# Patient Record
Sex: FEMALE | Race: WHITE | Employment: UNEMPLOYED | URBAN - METROPOLITAN AREA
[De-identification: names, ages, dates, MRNs, and addresses within clinical notes are randomized per-mention and may not be internally consistent; named-entity substitution may affect disease eponyms.]

---

## 2020-09-16 ENCOUNTER — HOSPITAL ENCOUNTER (EMERGENCY)
Age: 60
Discharge: HOME | End: 2020-09-16
Attending: EMERGENCY MEDICINE

## 2020-09-16 VITALS
WEIGHT: 160 LBS | SYSTOLIC BLOOD PRESSURE: 160 MMHG | DIASTOLIC BLOOD PRESSURE: 69 MMHG | BODY MASS INDEX: 27.31 KG/M2 | HEIGHT: 64 IN | RESPIRATION RATE: 16 BRPM | HEART RATE: 81 BPM | OXYGEN SATURATION: 100 % | TEMPERATURE: 97.3 F

## 2020-09-16 DIAGNOSIS — S05.01XA ABRASION OF RIGHT CORNEA, INITIAL ENCOUNTER: Primary | ICD-10-CM

## 2020-09-16 PROCEDURE — 99283 EMERGENCY DEPT VISIT LOW MDM: CPT

## 2020-09-16 PROCEDURE — 74011000250 HC RX REV CODE- 250: Performed by: EMERGENCY MEDICINE

## 2020-09-16 RX ORDER — OFLOXACIN 3 MG/ML
SOLUTION/ DROPS OPHTHALMIC
Qty: 5 ML | Refills: 0 | Status: SHIPPED | OUTPATIENT
Start: 2020-09-16

## 2020-09-16 RX ORDER — PROPARACAINE HYDROCHLORIDE 5 MG/ML
1 SOLUTION/ DROPS OPHTHALMIC
Status: COMPLETED | OUTPATIENT
Start: 2020-09-16 | End: 2020-09-16

## 2020-09-16 RX ADMIN — PROPARACAINE HYDROCHLORIDE 1 DROP: 5 SOLUTION/ DROPS OPHTHALMIC at 01:50

## 2020-09-16 RX ADMIN — FLUORESCEIN SODIUM 1 STRIP: 0.6 STRIP OPHTHALMIC at 01:50

## 2020-09-16 NOTE — DISCHARGE INSTRUCTIONS
You were seen and evaluated in the Emergency Department. Please understand that your work up is not all encompassing and you should follow up with your primary care physician for further management and continuity of care. Please return to Emergency Department or seek medical attention immediately if you have acute worsening in your symptoms or develop chest pain, shortness of breath, repeated vomiting, fever, altered level of consciousness, coughing up blood, or start sweating and feel clammy. If you were prescribed any medicine for home, please take as prescribed by your health-care provider. If you were given any follow-up appointments or numbers to call, please do so as instructed. Avoid any tobacco products or excessive alcohol. Patient Education        Corneal Scratches: Care Instructions  Your Care Instructions     The cornea is the clear surface that covers the front of the eye. When a speck of dirt, a wood chip, an insect, or another object flies into your eye, it can cause a painful scratch on the cornea. Wearing contact lenses too long or rubbing your eyes can also scratch the cornea. Small scratches usually heal in a day or two. Deeper scratches may take longer. If you have had a foreign object removed from your eye or you have a corneal scratch, you will need to watch for infection and vision problems while your eye heals. Follow-up care is a key part of your treatment and safety. Be sure to make and go to all appointments, and call your doctor if you are having problems. It's also a good idea to know your test results and keep a list of the medicines you take. How can you care for yourself at home? · The doctor probably used a medicine during your exam to numb your eye. When it wears off in 30 to 60 minutes, your eye pain may come back. Take pain medicines exactly as directed. ? If the doctor gave you a prescription medicine for pain, take it as prescribed.   ? If you are not taking a prescription pain medicine, ask your doctor if you can take an over-the-counter medicine. ? Do not take two or more pain medicines at the same time unless the doctor told you to. Many pain medicines have acetaminophen, which is Tylenol. Too much acetaminophen (Tylenol) can be harmful. · Do not rub your injured eye. Rubbing can make it worse. · Use the prescribed eyedrops or ointment as directed. Be sure the dropper or bottle tip is clean. To put in eyedrops or ointment:  ? Tilt your head back, and pull your lower eyelid down with one finger. ? Drop or squirt the medicine inside the lower lid. ? Close your eye for 30 to 60 seconds to let the drops or ointment move around. ? Do not touch the ointment or dropper tip to your eyelashes or any other surface. · Do not use your contact lens in your hurt eye until your doctor says you can. Also, do not wear eye makeup until your eye has healed. · Do not drive if you have blurred vision. · Bright light may hurt. Sunglasses can help. · To prevent eye injuries in the future, wear safety glasses or goggles when you work with machines or tools, mow the lawn, or ride a bike or motorcycle. When should you call for help? Call your doctor now or seek immediate medical care if:    · You have signs of an eye infection, such as:  ? Pus or thick discharge coming from the eye.  ? Redness or swelling around the eye.  ? A fever.     · You have new or worse eye pain.     · You have vision changes.     · It feels like there is something in your eye.     · Light hurts your eye. Watch closely for changes in your health, and be sure to contact your doctor if:    · You do not get better as expected. Where can you learn more? Go to http://guerita-kylee.info/  Enter G403 in the search box to learn more about \"Corneal Scratches: Care Instructions. \"  Current as of: December 18, 2019               Content Version: 12.6  © 1985-0456 [employer].    Care instructions adapted under license by [employer] (which disclaims liability or warranty for this information). If you have questions about a medical condition or this instruction, always ask your healthcare professional. [address] any warranty or liability for your use of this information.

## 2020-09-16 NOTE — ED TRIAGE NOTES
Pt arrives c/o right eye pain. Pt states that she was seen previously and prescribed a medication that she was unable to . Pt states that the pain \"hurts and feels like there is a critter running around in there. \" Pt states she was told she had a cyst on her eyelid.

## 2020-09-16 NOTE — ED PROVIDER NOTES
EMERGENCY DEPARTMENT HISTORY AND PHYSICAL EXAM    Date: 9/16/2020  Patient Name: Gaylan Alpers    History of Presenting Illness     Chief Complaint   Patient presents with    Eye Pain         History Provided By: Patient    Additional History (Context):   Gaylan Alpers is a 60 y.o. female with PMHX nicotine dependence presents to the emergency department C/O right eye pain x2 days. Was seen by her ophthalmologist twice and was told that she had a corneal abrasion. Was prescribed antibiotic ophthalmic drops however did not  the prescription. Reports photophobia. Pt denies fever, visual changes, eye discharge, and any other sxs or complaints. PCP: No primary care provider on file. Past History     Past Medical History:  Past Medical History:   Diagnosis Date    Heart attack [facility])        Past Surgical History:  Past Surgical History:   Procedure Laterality Date    HX CATARACT REMOVAL         Family History:  History reviewed. No pertinent family history. Social History:  Social History     Tobacco Use    Smoking status: Light Tobacco Smoker   Substance Use Topics    Alcohol use: Yes     Comment: socially    Drug use: Never       Allergies: Allergies   Allergen Reactions    Codeine Other (comments)     hallucinations         Review of Systems   Review of Systems   Constitutional: Negative for chills and fever. HENT: Negative for congestion, ear pain, sinus pain and sore throat. Eyes: Positive for photophobia, pain and redness. Negative for visual disturbance. Respiratory: Negative for cough and shortness of breath. Cardiovascular: Negative for chest pain and leg swelling. Gastrointestinal: Negative for abdominal pain, constipation, diarrhea, nausea and vomiting. Genitourinary: Negative for dysuria, hematuria, vaginal bleeding and vaginal discharge. Musculoskeletal: Negative for back pain and neck pain. Skin: Negative for rash and wound.    Neurological: Negative for dizziness, tremors, weakness, light-headedness and numbness. All other systems reviewed and are negative. Physical Exam     Vitals:    09/16/20 0142   BP: (!) 160/69   Pulse: 81   Resp: 16   Temp: 97.3 °F (36.3 °C)   SpO2: 100%   Weight: 72.6 kg (160 lb)   Height: 5' 4\" (1.626 m)     Physical Exam    Nursing note and vitals reviewed    Constitutional: Elderly  female, no acute distress  Head: Normocephalic, Atraumatic  Eyes: Pupils are equal, round, and reactive to light, EOMI. mild erythematous conjunctiva on the right. Neck: Supple, non-tender  Chest: Normal work of breathing and chest excursion bilaterally  Back: No evidence of trauma or deformity  Extremities: No evidence of trauma or deformity, no LE edema. No streaking erythema, vesicular lesions, ulcerations or bulla  Skin: Warm and dry, normal cap refill  Neuro: Alert and appropriate, CN intact, normal speech, moving all 4 extremities freely and symmetrically  Psychiatric: Normal mood and affect       Diagnostic Study Results     Labs -   No results found for this or any previous visit (from the past 12 hour(s)). Radiologic Studies -   No orders to display     CT Results  (Last 48 hours)    None        CXR Results  (Last 48 hours)    None            Medical Decision Making   I am the first provider for this patient. I reviewed the vital signs, available nursing notes, past medical history, past surgical history, family history and social history. Vital Signs-Reviewed the patient's vital signs. Records Reviewed: Nursing Notes and Old Medical Records    Provider Notes:   60 y.o. female presenting with right eye pain x2 days. On exam patient is sitting in a darkened room. She has mild erythematous conjunctiva on the right. However pupils are equal and reactive to light. Eye was anesthetized using topical proparacaine. Fluorescein strip stain was applied during a corneal abrasion at the 1 o'clock position.   Will discharge with moxifloxacin ophthalmic drops. Procedures:  Eye Stain      Date/Time: 9/16/2020 2:04 AM    Performed by: attending        Corneal abrasion was present on eyelid eversion. Right eye location: 1 o'clock  Cornea is clear. Anterior chamber is clear. My total time at bedside, performing this procedure was 1-15 minutes. ED Course:   1:48 AM   Initial assessment performed. The patients presenting problems have been discussed, and they are in agreement with the care plan formulated and outlined with them. I have encouraged them to ask questions as they arise throughout their visit. SMOKING CESSATION:  The patient was counseled on the dangers of tobacco use, and was advised to quit. Reviewed strategies to maximize success, including removing cigarettes and smoking materials from environment. Discussion took 3-5 minutes, and pt expressed understanding. Diagnosis and Disposition       DISCHARGE NOTE:  2:02 AM    Beth Payne's  results have been reviewed with her. She has been counseled regarding her diagnosis, treatment, and plan. She verbally conveys understanding and agreement of the signs, symptoms, diagnosis, treatment and prognosis and additionally agrees to follow up as discussed. She also agrees with the care-plan and conveys that all of her questions have been answered. I have also provided discharge instructions for her that include: educational information regarding their diagnosis and treatment, and list of reasons why they would want to return to the ED prior to their follow-up appointment, should her condition change. She has been provided with education for proper emergency department utilization. CLINICAL IMPRESSION:    1. Abrasion of right cornea, initial encounter        PLAN:  1. D/C Home  2.    Current Discharge Medication List      START taking these medications    Details   ofloxacin (FLOXIN) 0.3 % ophthalmic solution Apply to R eye BID x 7 days  Qty: 5 mL, Refills: 0           3.   Follow-up Information     Follow up With Specialties Details Why Contact Info    [address]  Schedule an appointment as soon as possible for a visit  [facility]    THE [facility] EMERGENCY DEPT Emergency Medicine  As needed if symptoms worsen [address] [id number]  [phone]        ____________________________________     Please note that this dictation was completed with [employer], the computer voice recognition software. Quite often unanticipated grammatical, syntax, homophones, and other interpretive errors are inadvertently transcribed by the computer software. Please disregard these errors. Please excuse any errors that have escaped final proofreading.